# Patient Record
Sex: FEMALE | Race: WHITE | Employment: UNEMPLOYED | URBAN - METROPOLITAN AREA
[De-identification: names, ages, dates, MRNs, and addresses within clinical notes are randomized per-mention and may not be internally consistent; named-entity substitution may affect disease eponyms.]

---
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
## 2024-02-14 ENCOUNTER — HOSPITAL ENCOUNTER (OUTPATIENT)
Dept: RADIOLOGY | Facility: HOSPITAL | Age: 19
Discharge: HOME | End: 2024-02-14
Attending: NURSE PRACTITIONER
Payer: MEDICAID

## 2024-02-14 VITALS — BODY MASS INDEX: 16.05 KG/M2 | WEIGHT: 94 LBS | HEIGHT: 64 IN

## 2024-02-14 DIAGNOSIS — N63.10 MASS OF RIGHT BREAST: ICD-10-CM

## 2024-02-14 PROCEDURE — 76641 ULTRASOUND BREAST COMPLETE: CPT | Mod: 26,RT,, | Performed by: STUDENT IN AN ORGANIZED HEALTH CARE EDUCATION/TRAINING PROGRAM

## 2024-02-14 PROCEDURE — 76641 ULTRASOUND BREAST COMPLETE: CPT | Mod: TC,RT

## 2025-02-15 ENCOUNTER — NURSE TRIAGE (OUTPATIENT)
Dept: ADMINISTRATIVE | Facility: CLINIC | Age: 20
End: 2025-02-15
Payer: MEDICAID

## 2025-02-16 NOTE — TELEPHONE ENCOUNTER
"Pt states was tested and treated for Chlamydia. Pt states was recently diagnosed again. Pt asking if it could be from same partner, partner was not given "shot" after first diagnosis. Per protocol, home care advised. Discussed education and symptoms to monitor for. Advised to call back for any further questions or concerns.   Reason for Disposition   Chlamydia, questions about    Protocols used: STD [id number]    "